# Patient Record
Sex: FEMALE | Race: WHITE | NOT HISPANIC OR LATINO | Employment: OTHER | URBAN - METROPOLITAN AREA
[De-identification: names, ages, dates, MRNs, and addresses within clinical notes are randomized per-mention and may not be internally consistent; named-entity substitution may affect disease eponyms.]

---

## 2020-03-27 ENCOUNTER — HOSPITAL ENCOUNTER (EMERGENCY)
Facility: HOSPITAL | Age: 45
Discharge: HOME | End: 2020-03-27
Attending: EMERGENCY MEDICINE | Admitting: EMERGENCY MEDICINE
Payer: COMMERCIAL

## 2020-03-27 ENCOUNTER — APPOINTMENT (EMERGENCY)
Dept: RADIOLOGY | Facility: HOSPITAL | Age: 45
End: 2020-03-27
Payer: COMMERCIAL

## 2020-03-27 VITALS
OXYGEN SATURATION: 99 % | HEIGHT: 61 IN | TEMPERATURE: 97.8 F | HEART RATE: 74 BPM | BODY MASS INDEX: 29.07 KG/M2 | RESPIRATION RATE: 20 BRPM | SYSTOLIC BLOOD PRESSURE: 137 MMHG | WEIGHT: 154 LBS | DIASTOLIC BLOOD PRESSURE: 83 MMHG

## 2020-03-27 DIAGNOSIS — R07.9 RIGHT-SIDED CHEST PAIN: Primary | ICD-10-CM

## 2020-03-27 LAB
ALBUMIN SERPL BCP-MCNC: 4.2 G/DL (ref 3.5–5)
ALP SERPL-CCNC: 59 U/L (ref 46–116)
ALT SERPL W P-5'-P-CCNC: 29 U/L (ref 12–78)
ANION GAP SERPL CALCULATED.3IONS-SCNC: 8 MMOL/L (ref 4–13)
APTT PPP: 28 SECONDS (ref 23–37)
AST SERPL W P-5'-P-CCNC: 15 U/L (ref 5–45)
ATRIAL RATE: 71 BPM
BASOPHILS # BLD AUTO: 0.07 THOUSANDS/ΜL (ref 0–0.1)
BASOPHILS NFR BLD AUTO: 1 % (ref 0–1)
BILIRUB SERPL-MCNC: 0.4 MG/DL (ref 0.2–1)
BUN SERPL-MCNC: 15 MG/DL (ref 5–25)
CALCIUM SERPL-MCNC: 9.1 MG/DL (ref 8.3–10.1)
CHLORIDE SERPL-SCNC: 101 MMOL/L (ref 100–108)
CO2 SERPL-SCNC: 28 MMOL/L (ref 21–32)
CREAT SERPL-MCNC: 0.78 MG/DL (ref 0.6–1.3)
D DIMER PPP FEU-MCNC: 0.28 UG/ML FEU
EOSINOPHIL # BLD AUTO: 0.18 THOUSAND/ΜL (ref 0–0.61)
EOSINOPHIL NFR BLD AUTO: 1 % (ref 0–6)
ERYTHROCYTE [DISTWIDTH] IN BLOOD BY AUTOMATED COUNT: 13.6 % (ref 11.6–15.1)
GFR SERPL CREATININE-BSD FRML MDRD: 92 ML/MIN/1.73SQ M
GLUCOSE SERPL-MCNC: 92 MG/DL (ref 65–140)
HCT VFR BLD AUTO: 43.6 % (ref 34.8–46.1)
HGB BLD-MCNC: 14.3 G/DL (ref 11.5–15.4)
IMM GRANULOCYTES # BLD AUTO: 0.04 THOUSAND/UL (ref 0–0.2)
IMM GRANULOCYTES NFR BLD AUTO: 0 % (ref 0–2)
INR PPP: 0.97 (ref 0.84–1.19)
LYMPHOCYTES # BLD AUTO: 3.26 THOUSANDS/ΜL (ref 0.6–4.47)
LYMPHOCYTES NFR BLD AUTO: 24 % (ref 14–44)
MCH RBC QN AUTO: 31.2 PG (ref 26.8–34.3)
MCHC RBC AUTO-ENTMCNC: 32.8 G/DL (ref 31.4–37.4)
MCV RBC AUTO: 95 FL (ref 82–98)
MONOCYTES # BLD AUTO: 0.78 THOUSAND/ΜL (ref 0.17–1.22)
MONOCYTES NFR BLD AUTO: 6 % (ref 4–12)
NEUTROPHILS # BLD AUTO: 9.37 THOUSANDS/ΜL (ref 1.85–7.62)
NEUTS SEG NFR BLD AUTO: 68 % (ref 43–75)
NRBC BLD AUTO-RTO: 0 /100 WBCS
P AXIS: 73 DEGREES
PLATELET # BLD AUTO: 302 THOUSANDS/UL (ref 149–390)
PMV BLD AUTO: 9.2 FL (ref 8.9–12.7)
POTASSIUM SERPL-SCNC: 3.7 MMOL/L (ref 3.5–5.3)
PR INTERVAL: 160 MS
PROT SERPL-MCNC: 7.5 G/DL (ref 6.4–8.2)
PROTHROMBIN TIME: 12.6 SECONDS (ref 11.6–14.5)
QRS AXIS: 70 DEGREES
QRSD INTERVAL: 74 MS
QT INTERVAL: 394 MS
QTC INTERVAL: 428 MS
RBC # BLD AUTO: 4.59 MILLION/UL (ref 3.81–5.12)
SODIUM SERPL-SCNC: 137 MMOL/L (ref 136–145)
T WAVE AXIS: 72 DEGREES
TROPONIN I SERPL-MCNC: <0.02 NG/ML
VENTRICULAR RATE: 71 BPM
WBC # BLD AUTO: 13.7 THOUSAND/UL (ref 4.31–10.16)

## 2020-03-27 PROCEDURE — 84484 ASSAY OF TROPONIN QUANT: CPT | Performed by: PHYSICIAN ASSISTANT

## 2020-03-27 PROCEDURE — 93005 ELECTROCARDIOGRAM TRACING: CPT

## 2020-03-27 PROCEDURE — 99285 EMERGENCY DEPT VISIT HI MDM: CPT

## 2020-03-27 PROCEDURE — 85379 FIBRIN DEGRADATION QUANT: CPT | Performed by: PHYSICIAN ASSISTANT

## 2020-03-27 PROCEDURE — 99284 EMERGENCY DEPT VISIT MOD MDM: CPT | Performed by: PHYSICIAN ASSISTANT

## 2020-03-27 PROCEDURE — 71046 X-RAY EXAM CHEST 2 VIEWS: CPT

## 2020-03-27 PROCEDURE — 93010 ELECTROCARDIOGRAM REPORT: CPT | Performed by: INTERNAL MEDICINE

## 2020-03-27 PROCEDURE — 80053 COMPREHEN METABOLIC PANEL: CPT | Performed by: PHYSICIAN ASSISTANT

## 2020-03-27 PROCEDURE — 85025 COMPLETE CBC W/AUTO DIFF WBC: CPT | Performed by: PHYSICIAN ASSISTANT

## 2020-03-27 PROCEDURE — 85610 PROTHROMBIN TIME: CPT | Performed by: PHYSICIAN ASSISTANT

## 2020-03-27 PROCEDURE — 85730 THROMBOPLASTIN TIME PARTIAL: CPT | Performed by: PHYSICIAN ASSISTANT

## 2020-03-27 NOTE — ED PROVIDER NOTES
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  No fevers, chills, or cough  No SOB  She took advil and tylenol for the pain, but it didn't help  History provided by:  Patient  Chest Pain   Pain location:  R chest  Pain quality: sharp    Pain radiates to:  Upper back  Pain radiates to the back: yes    Pain severity:  Mild  Onset quality:  Gradual  Duration:  1 week  Timing:  Intermittent  Progression:  Unchanged  Chronicity:  New  Relieved by:  Rest  Exacerbated by: exhaling after a deep breath  Ineffective treatments: tylenol/NSAIDS  Associated symptoms: no abdominal pain, no altered mental status, no anxiety, no back pain, no cough, no dizziness, no fatigue, no fever, no headache, no heartburn, no lower extremity edema, no nausea, no numbness, no palpitations, no shortness of breath, no syncope, not vomiting and no weakness    Risk factors: smoking    Risk factors: no aortic disease, no birth control, no coronary artery disease, no diabetes mellitus, no high cholesterol, no hypertension, no immobilization, not obese and no surgery        None       History reviewed  No pertinent past medical history  Past Surgical History:   Procedure Laterality Date    CHOLECYSTECTOMY      HERNIA REPAIR      ROTATOR CUFF REPAIR      TUBAL LIGATION         History reviewed  No pertinent family history  I have reviewed and agree with the history as documented      E-Cigarette/Vaping    E-Cigarette Use Never User      E-Cigarette/Vaping Substances    Nicotine No     Flavoring No      Social History     Tobacco Use    Smoking status: Current Every Day Smoker     Packs/day: 1 00     Types: Cigarettes    Smokeless tobacco: Never Used   Substance Use Topics    Alcohol use: Never     Frequency: Never    Drug use: Never       Review of Systems   Constitutional: Negative for fatigue and fever  HENT: Negative  Eyes: Negative for visual disturbance  Respiratory: Negative for cough and shortness of breath  Cardiovascular: Positive for chest pain  Negative for palpitations, leg swelling and syncope  Gastrointestinal: Negative for abdominal pain, heartburn, nausea and vomiting  Genitourinary: Negative for dysuria  Musculoskeletal: Negative for back pain and neck pain  Skin: Negative for color change and rash  Neurological: Negative for dizziness, seizures, speech difficulty, weakness, light-headedness, numbness and headaches  Psychiatric/Behavioral: Negative for confusion  All other systems reviewed and are negative  Physical Exam  Physical Exam   Constitutional: She is oriented to person, place, and time  She appears well-developed and well-nourished  She is cooperative  She does not appear ill  No distress  HENT:   Head: Normocephalic and atraumatic  Right Ear: Hearing normal    Left Ear: Hearing normal    Nose: Nose normal    Mouth/Throat: Oropharynx is clear and moist and mucous membranes are normal    Eyes: Conjunctivae are normal    Neck: Normal range of motion  Cardiovascular: Normal rate, regular rhythm and normal heart sounds  No murmur heard  Pulmonary/Chest: Effort normal and breath sounds normal  She has no wheezes  She has no rhonchi  She has no rales  Musculoskeletal: Normal range of motion  She exhibits no edema, tenderness or deformity  Neurological: She is alert and oriented to person, place, and time  She has normal strength  No sensory deficit  Gait normal    Skin: Skin is warm and dry   No rash noted  She is not diaphoretic  No pallor  Psychiatric: She has a normal mood and affect  Her speech is normal  Cognition and memory are normal    Nursing note and vitals reviewed        Vital Signs  ED Triage Vitals   Temperature Pulse Respirations Blood Pressure SpO2   03/27/20 1526 03/27/20 1526 03/27/20 1526 03/27/20 1530 03/27/20 1526   97 8 °F (36 6 °C) 74 20 137/83 99 %      Temp Source Heart Rate Source Patient Position - Orthostatic VS BP Location FiO2 (%)   03/27/20 1526 03/27/20 1526 03/27/20 1526 03/27/20 1526 --   Tympanic Monitor Sitting Left arm       Pain Score       --                  Vitals:    03/27/20 1526 03/27/20 1530   BP:  137/83   Pulse: 74    Patient Position - Orthostatic VS: Sitting          Visual Acuity      ED Medications  Medications - No data to display    Diagnostic Studies  Results Reviewed     Procedure Component Value Units Date/Time    D-Dimer [[id number]]  (Normal) Collected:  03/27/20 1533    Lab Status:  Final result Specimen:  Blood from Arm, Right Updated:  03/27/20 1634     D-Dimer, Quant 0 28 ug/ml FEU     Protime-INR [[id number]]  (Normal) Collected:  03/27/20 1533    Lab Status:  Final result Specimen:  Blood from Arm, Right Updated:  03/27/20 1605     Protime 12 6 seconds      INR 0 97    APTT [[id number]]  (Normal) Collected:  03/27/20 1533    Lab Status:  Final result Specimen:  Blood from Arm, Right Updated:  03/27/20 1605     PTT 28 seconds     Troponin I [[id number]]  (Normal) Collected:  03/27/20 1533    Lab Status:  Final result Specimen:  Blood from Arm, Right Updated:  03/27/20 1603     Troponin I <0 02 ng/mL     Comprehensive metabolic panel [[id number]] Collected:  03/27/20 1533    Lab Status:  Final result Specimen:  Blood from Arm, Right Updated:  03/27/20 1559     Sodium 137 mmol/L      Potassium 3 7 mmol/L      Chloride 101 mmol/L      CO2 28 mmol/L      ANION GAP 8 mmol/L      BUN 15 mg/dL      Creatinine 0 78 mg/dL      Glucose 92 mg/dL      Calcium 9 1 mg/dL      AST 15 U/L      ALT 29 U/L      Alkaline Phosphatase 59 U/L      Total Protein 7 5 g/dL      Albumin 4 2 g/dL      Total Bilirubin 0 40 mg/dL      eGFR 92 ml/min/1 73sq m     Narrative:       [place] guidelines for Chronic Kidney Disease (CKD):     Stage 1 with normal or high GFR (GFR > 90 mL/min/1 73 square meters)    Stage 2 Mild CKD (GFR = 60-89 mL/min/1 73 square meters)    Stage 3A Moderate CKD (GFR = 45-59 mL/min/1 73 square meters)    Stage 3B Moderate CKD (GFR = 30-44 mL/min/1 73 square meters)    Stage 4 Severe CKD (GFR = 15-29 mL/min/1 73 square meters)    Stage 5 End Stage CKD (GFR <15 mL/min/1 73 square meters)  Note: GFR calculation is accurate only with a steady state creatinine    CBC and differential [[id number]]  (Abnormal) Collected:  03/27/20 1533    Lab Status:  Final result Specimen:  Blood from Arm, Right Updated:  03/27/20 1543     WBC 13 70 Thousand/uL      RBC 4 59 Million/uL      Hemoglobin 14 3 g/dL      Hematocrit 43 6 %      MCV 95 fL      MCH 31 2 pg      MCHC 32 8 g/dL      RDW 13 6 %      MPV 9 2 fL      Platelets 202 Thousands/uL      nRBC 0 /100 WBCs      Neutrophils Relative 68 %      Immat GRANS % 0 %      Lymphocytes Relative 24 %      Monocytes Relative 6 %      Eosinophils Relative 1 %      Basophils Relative 1 %      Neutrophils Absolute 9 37 Thousands/µL      Immature Grans Absolute 0 04 Thousand/uL      Lymphocytes Absolute 3 26 Thousands/µL      Monocytes Absolute 0 78 Thousand/µL      Eosinophils Absolute 0 18 Thousand/µL      Basophils Absolute 0 07 Thousands/µL                  XR chest 2 views   Final Result by Haley Nugent MD (03/27 1601)      No acute cardiopulmonary disease              Workstation performed: [id number]                    Procedures  ECG 12 Lead Documentation Only  Date/Time: 3/27/2020 3:41 PM  Performed by: Perlita Greenberg PA-C  Authorized by: Perlita Greenberg PA-C     Indications / Diagnosis:  Chest pain    ECG reviewed by me, the ED Provider: yes    Patient location:  ED  Previous ECG:     Previous ECG:  Unavailable  Rate:     ECG rate:  71  Rhythm:     Rhythm: sinus rhythm    Conduction:     Conduction: normal    ST segments:     ST segments:  Normal  T waves:     T waves: normal               ED Course         HEART Risk Score      Most Recent Value   Heart Score Risk Calculator   History  0 Filed at: 03/27/2020 1606   ECG  0 Filed at: 03/27/2020 1606   Age  0 Filed at: 03/27/2020 1606   Risk Factors  0 Filed at: 03/27/2020 1606   Troponin  0 Filed at: 03/27/2020 1606   HEART Score  0 Filed at: 03/27/2020 1606                        Wells' Criteria for PE      Most Recent Value   Wells' Criteria for PE   Clinical signs and symptoms of DVT  0 Filed at: 03/27/2020 1648   PE is primary diagnosis or equally likely  0 Filed at: 03/27/2020 1648   HR >100  0 Filed at: 03/27/2020 1648   Immobilization at least 3 days or Surgery in the previous 4 weeks  0 Filed at: 03/27/2020 1648   Previous, objectively diagnosed PE or DVT  0 Filed at: 03/27/2020 1648   Hemoptysis  0 Filed at: 03/27/2020 1648   Malignancy with treatment within 6 months or palliative  0 Filed at: 03/27/2020 1648   Wells' Criteria Total  0 Filed at: 03/27/2020 1648            MDM  Number of Diagnoses or Management Options  Right-sided chest pain: new and requires workup  Diagnosis management comments: Patient with right sided chest pain, will order labs, ekg, cxr to r/o cardiopulmonary disease  D-dimer negative pulse ox 99%, not tachycardic, doubt PE  D/w patient most likely musculoskeletal and advised f/u with PCP for elevated WBC          Amount and/or Complexity of Data Reviewed  Clinical lab tests: ordered and reviewed  Tests in the radiology section of CPT®: ordered and reviewed    Patient Progress  Patient progress: stable        Disposition  Final diagnoses:   Right-sided chest pain     Time reflects when diagnosis was documented in both MDM as applicable and the Disposition within this note     Time User Action Codes Description Comment    3/27/2020  4:48 PM Radha Brady Add [R07 9] Right-sided chest pain       ED Disposition     ED Disposition Condition Date/Time Comment    Discharge Stable Fri Mar 27, 2020  4:48 PM Hilary Hale discharge to home/self care  Follow-up Information     Follow up With Specialties Details Why Contact Info    [facility] Call in 1 week For recheck of WBC  [address]  [address] [place] [address]  [phone]            There are no discharge medications for this patient  No discharge procedures on file      PDMP Review     None          ED Provider  Electronically Signed by           Gennett Boast, PA-C  03/27/20 9051

## 2020-03-27 NOTE — DISCHARGE INSTRUCTIONS
Rest, ice to chest for 2 days, heating pad after 2 days  Take advil 600mg every 6 hours as needed for pain  Follow up with family doctor in 1 week for recheck of white count

## 2021-05-14 ENCOUNTER — APPOINTMENT (EMERGENCY)
Dept: RADIOLOGY | Facility: HOSPITAL | Age: 46
End: 2021-05-14
Payer: COMMERCIAL

## 2021-05-14 ENCOUNTER — HOSPITAL ENCOUNTER (EMERGENCY)
Facility: HOSPITAL | Age: 46
Discharge: HOME | End: 2021-05-14
Attending: EMERGENCY MEDICINE
Payer: COMMERCIAL

## 2021-05-14 VITALS
HEIGHT: 63 IN | DIASTOLIC BLOOD PRESSURE: 61 MMHG | HEART RATE: 63 BPM | WEIGHT: 153 LBS | BODY MASS INDEX: 27.11 KG/M2 | SYSTOLIC BLOOD PRESSURE: 129 MMHG | OXYGEN SATURATION: 99 % | RESPIRATION RATE: 18 BRPM | TEMPERATURE: 99.7 F

## 2021-05-14 DIAGNOSIS — R55 SYNCOPE, UNSPECIFIED SYNCOPE TYPE: ICD-10-CM

## 2021-05-14 DIAGNOSIS — S49.92XA INJURY OF LEFT SHOULDER, INITIAL ENCOUNTER: Primary | ICD-10-CM
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
## 2021-05-14 LAB
ALBUMIN SERPL-MCNC: 4.5 G/DL (ref 3.4–5)
ALP SERPL-CCNC: 51 IU/L (ref 35–126)
ALT SERPL-CCNC: 17 IU/L (ref 11–54)
ANION GAP SERPL CALC-SCNC: 10 MEQ/L (ref 3–15)
AST SERPL-CCNC: 19 IU/L (ref 15–41)
BASOPHILS # BLD: 0.06 K/UL (ref 0.01–0.1)
BASOPHILS NFR BLD: 0.5 %
BILIRUB SERPL-MCNC: 0.7 MG/DL (ref 0.3–1.2)
BUN SERPL-MCNC: 18 MG/DL (ref 8–20)
CALCIUM SERPL-MCNC: 9.1 MG/DL (ref 8.9–10.3)
CHLORIDE SERPL-SCNC: 104 MEQ/L (ref 98–109)
CO2 SERPL-SCNC: 24 MEQ/L (ref 22–32)
CREAT SERPL-MCNC: 0.6 MG/DL (ref 0.6–1.1)
DIFFERENTIAL METHOD BLD: ABNORMAL
EOSINOPHIL # BLD: 0.15 K/UL (ref 0.04–0.36)
EOSINOPHIL NFR BLD: 1.2 %
ERYTHROCYTE [DISTWIDTH] IN BLOOD BY AUTOMATED COUNT: 13.3 % (ref 11.7–14.4)
GFR SERPL CREATININE-BSD FRML MDRD: >60 ML/MIN/1.73M*2
GLUCOSE SERPL-MCNC: 91 MG/DL (ref 70–99)
HCG UR QL: NEGATIVE
HCT VFR BLDCO AUTO: 40.7 % (ref 35–45)
HGB BLD-MCNC: 13.6 G/DL (ref 11.8–15.7)
IMM GRANULOCYTES # BLD AUTO: 0.04 K/UL (ref 0–0.08)
IMM GRANULOCYTES NFR BLD AUTO: 0.3 %
LYMPHOCYTES # BLD: 2.22 K/UL (ref 1.2–3.5)
LYMPHOCYTES NFR BLD: 17 %
MCH RBC QN AUTO: 31.1 PG (ref 28–33.2)
MCHC RBC AUTO-ENTMCNC: 33.4 G/DL (ref 32.2–35.5)
MCV RBC AUTO: 92.9 FL (ref 83–98)
MONOCYTES # BLD: 0.82 K/UL (ref 0.28–0.8)
MONOCYTES NFR BLD: 6.3 %
NEUTROPHILS # BLD: 9.74 K/UL (ref 1.7–7)
NEUTS SEG NFR BLD: 74.7 %
NRBC BLD-RTO: 0 %
PDW BLD AUTO: 9.5 FL (ref 9.4–12.3)
PLATELET # BLD AUTO: 276 K/UL (ref 150–369)
POTASSIUM SERPL-SCNC: 3.7 MEQ/L (ref 3.6–5.1)
PROT SERPL-MCNC: 7.1 G/DL (ref 6–8.2)
RBC # BLD AUTO: 4.38 M/UL (ref 3.93–5.22)
SODIUM SERPL-SCNC: 138 MEQ/L (ref 136–144)
TROPONIN I SERPL-MCNC: <0.02 NG/ML
WBC # BLD AUTO: 13.03 K/UL (ref 3.8–10.5)

## 2021-05-14 PROCEDURE — 84484 ASSAY OF TROPONIN QUANT: CPT | Performed by: EMERGENCY MEDICINE

## 2021-05-14 PROCEDURE — 80053 COMPREHEN METABOLIC PANEL: CPT | Performed by: EMERGENCY MEDICINE

## 2021-05-14 PROCEDURE — 73030 X-RAY EXAM OF SHOULDER: CPT | Mod: LT

## 2021-05-14 PROCEDURE — 85025 COMPLETE CBC W/AUTO DIFF WBC: CPT | Performed by: EMERGENCY MEDICINE

## 2021-05-14 PROCEDURE — 36415 COLL VENOUS BLD VENIPUNCTURE: CPT | Performed by: EMERGENCY MEDICINE

## 2021-05-14 PROCEDURE — 63700000 HC SELF-ADMINISTRABLE DRUG: Performed by: NURSE PRACTITIONER

## 2021-05-14 PROCEDURE — 73060 X-RAY EXAM OF HUMERUS: CPT | Mod: LT

## 2021-05-14 PROCEDURE — 93005 ELECTROCARDIOGRAM TRACING: CPT | Performed by: EMERGENCY MEDICINE

## 2021-05-14 PROCEDURE — 84703 CHORIONIC GONADOTROPIN ASSAY: CPT | Performed by: EMERGENCY MEDICINE

## 2021-05-14 PROCEDURE — 99283 EMERGENCY DEPT VISIT LOW MDM: CPT | Mod: 25

## 2021-05-14 PROCEDURE — 93005 ELECTROCARDIOGRAM TRACING: CPT

## 2021-05-14 RX ORDER — OXYCODONE HYDROCHLORIDE 5 MG/1
5 TABLET ORAL ONCE
Status: COMPLETED | OUTPATIENT
Start: 2021-05-14 | End: 2021-05-14

## 2021-05-14 RX ORDER — OXYCODONE AND ACETAMINOPHEN 5; 325 MG/1; MG/1
1 TABLET ORAL ONCE
Status: DISCONTINUED | OUTPATIENT
Start: 2021-05-14 | End: 2021-05-14

## 2021-05-14 RX ORDER — ACETAMINOPHEN 325 MG/1
975 TABLET ORAL ONCE
Status: COMPLETED | OUTPATIENT
Start: 2021-05-14 | End: 2021-05-14

## 2021-05-14 RX ADMIN — ACETAMINOPHEN 975 MG: 325 TABLET ORAL at 20:24

## 2021-05-14 RX ADMIN — OXYCODONE HYDROCHLORIDE 5 MG: 5 TABLET ORAL at 21:09

## 2021-05-14 ASSESSMENT — ENCOUNTER SYMPTOMS
BACK PAIN: 0
VOMITING: 0
CONFUSION: 0
COUGH: 0
SHORTNESS OF BREATH: 0
NAUSEA: 0
FEVER: 0
ABDOMINAL PAIN: 0
WOUND: 0
NECK PAIN: 0
BRUISES/BLEEDS EASILY: 0

## 2021-05-14 NOTE — ED ATTESTATION NOTE
Procedures  Physical Exam  Review of Systems    5/14/20217:50 PM  I have personally seen and examined the patient.  I reviewed and agree with the PA/NP/Resident's assessment and plan of care.    My examination, assessment, and plan of care of Kimi Becker is as follows:  The patient presents with left shoulder pain status post a fall.  Questionable vasovagal episode after the fall due to the pain.  Presently, the patient is complaining of left shoulder pain  Exam: Left proximal humeral tenderness and swelling  Impression/Plan: Imaging, observe    Vital Signs Review: Vital signs have been reviewed. The oxygen saturation is  SpO2: 99 % which is normal.    I was physically present for the key/critical portions of the following procedures: None    This document was created using dragon dictation software.  There might be some typographical errors due to this technology.     Eusebio Vanessa MD  05/14/21 1952

## 2021-05-14 NOTE — ED PROVIDER NOTES
HPI     45-year-old female presents to the emergency department via EMS status post a fall.  Patient states she was walking and slipped down one step and landed on her left side injuring her left shoulder and elbow.  She was crying and screaming out in pain the family try to get her up but then she passed out due to the pain.  There was no head trauma.  She had a loss consciousness for less than a minute.  She states she feels normal now besides her left shoulder and left elbow hurting her.  History of rotator cuff injury to left shoulder and gets injections for this.  She received 100 mcg of fentanyl prior to arrival.  Right-hand-dominant.  No further signs of trauma.    History reviewed. No pertinent past medical history.    Past Surgical History:   Procedure Laterality Date   •  SECTION     • CHOLECYSTECTOMY     • FOOT SURGERY     • HERNIA REPAIR     • ROTATOR CUFF REPAIR     • TUBAL LIGATION         No Known Allergies    Social History     Tobacco Use   Smoking Status Current Every Day Smoker   • Packs/day: 1.00   Smokeless Tobacco Never Used       Social History     Substance and Sexual Activity   Alcohol Use Not Currently       Social History     Substance and Sexual Activity   Drug Use Never       No LMP recorded.    Review of Systems   Constitutional: Negative for fever.   Respiratory: Negative for cough and shortness of breath.    Cardiovascular: Negative for chest pain.   Gastrointestinal: Negative for abdominal pain, nausea and vomiting.   Musculoskeletal: Negative for back pain, gait problem and neck pain.   Skin: Negative for wound.   Allergic/Immunologic: Negative for immunocompromised state.   Neurological: Positive for syncope.   Hematological: Does not bruise/bleed easily.   Psychiatric/Behavioral: Negative for confusion.       Vitals:    21 1933   BP: 129/61   BP Location: Right upper arm   Patient Position: Lying   Pulse: 63   Resp: 18   Temp: 37.6 °C (99.7 °F)   TempSrc: Temporal  "  SpO2: 99%   Weight: 69.4 kg (153 lb)   Height: 1.6 m (5' 3\")       Physical Exam  Vitals and nursing note reviewed.   HENT:      Head: Normocephalic and atraumatic.      Mouth/Throat:      Pharynx: Oropharynx is clear.   Cardiovascular:      Rate and Rhythm: Normal rate and regular rhythm.      Pulses:           Radial pulses are 2+ on the left side.   Pulmonary:      Effort: Pulmonary effort is normal.      Breath sounds: Normal breath sounds.   Abdominal:      Palpations: Abdomen is soft.      Tenderness: There is no abdominal tenderness.   Musculoskeletal:      Cervical back: Normal range of motion and neck supple. No tenderness.      Comments: Left shoulder tenderness to palpation but no external signs of trauma noted.    Left shoulder tenderness to palpation with redness overlying the area.  She does have distal clavicle tenderness to palpation.    Left arm is neurovascularly intact.   Skin:     General: Skin is warm and dry.      Capillary Refill: Capillary refill takes less than 2 seconds.   Neurological:      General: No focal deficit present.      Mental Status: She is alert and oriented to person, place, and time.      GCS: GCS eye subscore is 4. GCS verbal subscore is 5. GCS motor subscore is 6.      Cranial Nerves: Cranial nerves are intact. No cranial nerve deficit.      Sensory: Sensation is intact.      Motor: Motor function is intact.      Coordination: Coordination is intact.   Psychiatric:         Mood and Affect: Mood normal.         Behavior: Behavior normal.         Thought Content: Thought content normal.         Judgment: Judgment normal.         Procedures    ED Course as of May 14 2056   Fri May 14, 2021   1949 Impression: Mechanical fall injuring left shoulder left elbow.  Vasovagal syncope from pain.  Neuro exam unremarkable.  Left arm is neurovascularly intact.    Plan: CBC, CMP, hCG, troponin, left shoulder/left humerus/left elbow x-rays, EKG.  Left arm sling.  Tylenol.  Ice.    [AO] "   2021 Patient declined elbow x-ray.  Elbow was caught in the humerus x-ray and we do not see any acute changes.  Left shoulder x-ray and left humerus x-ray unremarkable.  Likely contusion.    [AO]   2054 Will give 1 dose of oxycodone now.  Patient updated on all results.  Already in left arm sling.  Advised to follow-up with her orthopedic Dr. Barrera in the meantime and no use of the left arm.  Supportive care reviewed.  Patient in agreement with the discharge plan of care.  Family to drive the patient home.    [AO]      ED Course User Index  [AO] Kristy Gray CRNP         Clinical Impressions as of May 14 2056   Fall, initial encounter   Syncope, unspecified syncope type   Injury of left shoulder, initial encounter       Final diagnoses:   [W19.XXXA] Fall, initial encounter   [R55] Syncope, unspecified syncope type   [S49.92XA] Injury of left shoulder, initial encounter       Labs Reviewed   CBC AND DIFF - Abnormal       Result Value    WBC 13.03 (*)     RBC 4.38      Hemoglobin 13.6      Hematocrit 40.7      MCV 92.9      MCH 31.1      MCHC 33.4      RDW 13.3      Platelets 276      MPV 9.5      Differential Type Auto      nRBC 0.0      Immature Granulocytes 0.3      Neutrophils 74.7      Lymphocytes 17.0      Monocytes 6.3      Eosinophils 1.2      Basophils 0.5      Immature Granulocytes, Absolute 0.04      Neutrophils, Absolute 9.74 (*)     Lymphocytes, Absolute 2.22      Monocytes, Absolute 0.82 (*)     Eosinophils, Absolute 0.15      Basophils, Absolute 0.06     COMPREHENSIVE METABOLIC PANEL - Normal    Sodium 138      Potassium 3.7      Chloride 104      CO2 24      BUN 18      Creatinine 0.6      Glucose 91      Calcium 9.1      AST (SGOT) 19      ALT (SGPT) 17      Alkaline Phosphatase 51      Total Protein 7.1      Albumin 4.5      Bilirubin, Total 0.7      eGFR >60.0      Anion Gap 10     TROPONIN I - Normal    Troponin I <0.02     BHCG, SERUM, QUAL - Normal    Preg Test, Serum Negative     RAINBOW  DRAW PANEL    Narrative:     The following orders were created for panel order [facility] Draw Panel.  Procedure                               Abnormality         Status                     ---------                               -----------         ------                     RAINBOW RED[[id number]]                                      In process                 RAINBOW LT BLUE[[id number]]                                  In process                 RAINBOW GOLD[[id number]]                                     In process                   Please view results for these tests on the individual orders.   RAINBOW RED   RAINBOW LT BLUE   RAINBOW GOLD       X-RAY SHOULDER LEFT 2+ VIEWS   ED Interpretation   Reviewed by Dr. Vanessa, no acute disease.      X-RAY HUMERUS LEFT   ED Interpretation   Reviewed by Dr. Vanessa, no acute disease.      ECG 12 lead   ED Interpretation   Interpreted with Dr. Vanessa.   Rhythm: Normal Sinus.  Rate: 77 bpm.  P wave interval: normal interval.  QRS: normal QRS.  QTc: normal.  ST Segments: no obvious ST elevation.    Rhythm Strip: NSR.    O2 sat: normal. 99% on RA.              Kristy Gray CRNP  05/14/21 2056

## 2021-05-15 LAB
ATRIAL RATE: 77
P AXIS: 81
PR INTERVAL: 152
QRS DURATION: 68
QT INTERVAL: 376
QTC CALCULATION(BAZETT): 425
R AXIS: 66
T WAVE AXIS: 67
VENTRICULAR RATE: 77

## 2021-05-15 NOTE — DISCHARGE INSTRUCTIONS
Please call your orthopedic for further evaluation.    No use of your left arm.    I would like you to ice on and off every 20 minutes as much as possible.    Keep your arm in the arm sling for comfort.    We believe you passed out due to the amount of pain you were having.  Make sure you are drinking plenty of fluids.  All of your lab work is nonconcerning as well as your EKG.    Please return the emergency department with any new or worsening symptoms.